# Patient Record
Sex: FEMALE | Race: ASIAN | NOT HISPANIC OR LATINO
[De-identification: names, ages, dates, MRNs, and addresses within clinical notes are randomized per-mention and may not be internally consistent; named-entity substitution may affect disease eponyms.]

---

## 2022-08-26 ENCOUNTER — NON-APPOINTMENT (OUTPATIENT)
Age: 77
End: 2022-08-26

## 2022-08-26 ENCOUNTER — APPOINTMENT (OUTPATIENT)
Dept: OPHTHALMOLOGY | Facility: CLINIC | Age: 77
End: 2022-08-26

## 2022-08-26 PROBLEM — Z00.00 ENCOUNTER FOR PREVENTIVE HEALTH EXAMINATION: Status: ACTIVE | Noted: 2022-08-26

## 2022-08-26 PROCEDURE — 92020 GONIOSCOPY: CPT

## 2022-08-26 PROCEDURE — 76514 ECHO EXAM OF EYE THICKNESS: CPT

## 2022-08-26 PROCEDURE — 92004 COMPRE OPH EXAM NEW PT 1/>: CPT

## 2022-08-26 PROCEDURE — 92133 CPTRZD OPH DX IMG PST SGM ON: CPT

## 2022-09-27 NOTE — ASU PATIENT PROFILE, ADULT - NSICDXPASTSURGICALHX_GEN_ALL_CORE_FT
PAST SURGICAL HISTORY:  Elective surgery Laser to both eyes    History of trabeculectomy both eyes

## 2022-09-27 NOTE — ASU PATIENT PROFILE, ADULT - NSICDXPASTMEDICALHX_GEN_ALL_CORE_FT
PAST MEDICAL HISTORY:  Chronic GERD     Diabetes PRE controlled by diet    Glaucoma     Hypertension

## 2022-09-28 ENCOUNTER — TRANSCRIPTION ENCOUNTER (OUTPATIENT)
Age: 77
End: 2022-09-28

## 2022-09-28 ENCOUNTER — APPOINTMENT (OUTPATIENT)
Dept: OPHTHALMOLOGY | Facility: AMBULATORY SURGERY CENTER | Age: 77
End: 2022-09-28

## 2022-09-28 ENCOUNTER — NON-APPOINTMENT (OUTPATIENT)
Age: 77
End: 2022-09-28

## 2022-09-28 ENCOUNTER — OUTPATIENT (OUTPATIENT)
Dept: OUTPATIENT SERVICES | Facility: HOSPITAL | Age: 77
LOS: 1 days | Discharge: HOME | End: 2022-09-28

## 2022-09-28 VITALS
RESPIRATION RATE: 14 BRPM | OXYGEN SATURATION: 98 % | WEIGHT: 83.78 LBS | HEART RATE: 80 BPM | SYSTOLIC BLOOD PRESSURE: 163 MMHG | DIASTOLIC BLOOD PRESSURE: 76 MMHG | HEIGHT: 58 IN | TEMPERATURE: 98 F

## 2022-09-28 VITALS
RESPIRATION RATE: 16 BRPM | SYSTOLIC BLOOD PRESSURE: 128 MMHG | TEMPERATURE: 98 F | HEART RATE: 80 BPM | DIASTOLIC BLOOD PRESSURE: 60 MMHG

## 2022-09-28 DIAGNOSIS — Z98.890 OTHER SPECIFIED POSTPROCEDURAL STATES: Chronic | ICD-10-CM

## 2022-09-28 DIAGNOSIS — Z41.9 ENCOUNTER FOR PROCEDURE FOR PURPOSES OTHER THAN REMEDYING HEALTH STATE, UNSPECIFIED: Chronic | ICD-10-CM

## 2022-09-28 PROCEDURE — 66170 GLAUCOMA SURGERY: CPT | Mod: RT

## 2022-09-28 RX ORDER — OFLOXACIN 0.3 %
1 DROPS OPHTHALMIC (EYE)
Refills: 0 | Status: COMPLETED | OUTPATIENT
Start: 2022-09-28 | End: 2022-09-28

## 2022-09-28 RX ADMIN — Medication 1 DROP(S): at 08:47

## 2022-09-28 RX ADMIN — Medication 1 DROP(S): at 08:55

## 2022-09-28 RX ADMIN — Medication 1 DROP(S): at 09:05

## 2022-09-28 NOTE — OPERATIVE REPORT - OPERATIVE RPOSRT DETAILS
Patient Name: ROBIN ALLEN    Medical Record Number: [id number]    DATE OF SURGERY: SEPTEMBER 28, 2022    OPERATING SURGEON: NARCISA OLIVEROS M.D.    ASSISTANT SURGEON: ANTHONY ROSARIO M.D.    ANESTHESIA: MONITORED ANESTHESIA CARE AND SUBCONJUNCTIVAL INJECTION.     PREOPERATIVE DIAGNOSIS: GLAUCOMA, RIGHT EYE    POSTOPERATIVE DIAGNOSIS: GLAUCOMA, RIGHT EYE    OPERATIVE PROCEDURE: TRABECULECTOMY WITH MITOMYCIN C, RIGHT EYE    COMPLICATIONS: NONE.     SPECIMENS: NONE.    ESTIMATED BLOOD LOSS: <1 cc    PATIENT CONDITION: STABLE.    PROCEDURE:  Prior to the procedure, all risks, benefits and alternatives were discussed with the patient, including but not limited to infection, bleeding, retinal detachment, increase or decrease in intraocular pressure, corneal edema, corneal decompensation, ptosis, diplopia, loss of vision, no improvement of vision, need for second surgery, macular edema, intraocular inflammation, etc. All questions were answered and the patient wished to proceed with the surgery. Informed consent was obtained.    The patient was wheeled to the operating room and placed on the operating table in a supine position. Next, the right eye was prepped and draped in the usual sterile fashion for intraocular surgery. An eyelid speculum was placed into the right eye.    A 7-0 silk traction suture was placed through the cornea and the eye was rotated inferiorly. Subconjunctival and subtenon lidocaine and mitomycin C 30 micrograms were then injected into the superior quadrant. A superior peritomy was then created with Vannas scissors and clot forceps. The area was carefully dissected posteriorly. A 4 x 2.5-mm scleral flap was created using a #75 blade and dissected half thickness to the limbus using a #57 blade.    Two 10-0 Nylon interrupted scleral flap sutures were pre-placed. #75 blade was used to create a temporal paracentesis. The scleral flap was grasped with .12 forceps and the anterior chamber was entered with a #75 blade. Next, a Glory punch was used to remove a portion of trabecular meshwork.    Two preplaced 10-0 Nylon scleral flap sutures were tied for appropriate flow. One 10-0 Nylon scleral flap suture was added for appropriate flow. The conjunctiva and tenon fascia were then re-approximated to the limbus using three interrupted 10-0 Nylon horizontal mattress sutures. Balanced salt solution was injected into the anterior chamber on a 30 gauge cannula. At the cessation of the procedure, the anterior chamber was well formed with a diffuse filtering bleb superiorly. All wounds were inspected meticulously and found to be watertight. Cefazolin and dexamethasone were applied on the eye. Eyelid speculum was removed. Topical antibiotic and steroid ointment was placed onto the right eye, which was then patched and shielded. The patient left the operating room in an excellent condition.

## 2022-09-28 NOTE — ASU DISCHARGE PLAN (ADULT/PEDIATRIC) - FREQUENT HAND WASHING PREVENTS THE SPREAD OF INFECTION.
Prior authorization in mailbox  
Submitted over CoverMyMeds.     LIDOCAINE Patch  Type of coverage approved: Prior Authorization  This approval authorizes your coverage from 03/15/2019 - 06/12/2020  
Statement Selected

## 2022-09-28 NOTE — ASU DISCHARGE PLAN (ADULT/PEDIATRIC) - NS MD DC FALL RISK RISK
For information on Fall & Injury Prevention, visit: [email or web address] OR  [email or web address] OR  https://www.cdc.gov/steadi/patient.html

## 2022-09-29 ENCOUNTER — NON-APPOINTMENT (OUTPATIENT)
Age: 77
End: 2022-09-29

## 2022-09-29 ENCOUNTER — APPOINTMENT (OUTPATIENT)
Dept: OPHTHALMOLOGY | Facility: CLINIC | Age: 77
End: 2022-09-29

## 2022-09-29 PROCEDURE — 99024 POSTOP FOLLOW-UP VISIT: CPT

## 2022-10-03 PROBLEM — H40.9 UNSPECIFIED GLAUCOMA: Chronic | Status: ACTIVE | Noted: 2022-09-27

## 2022-10-03 PROBLEM — E11.9 TYPE 2 DIABETES MELLITUS WITHOUT COMPLICATIONS: Chronic | Status: ACTIVE | Noted: 2022-09-27

## 2022-10-03 PROBLEM — I10 ESSENTIAL (PRIMARY) HYPERTENSION: Chronic | Status: ACTIVE | Noted: 2022-09-27

## 2022-10-03 PROBLEM — K21.9 GASTRO-ESOPHAGEAL REFLUX DISEASE WITHOUT ESOPHAGITIS: Chronic | Status: ACTIVE | Noted: 2022-09-27

## 2022-10-11 NOTE — ASU PATIENT PROFILE, ADULT - VISION (WITH CORRECTIVE LENSES IF THE PATIENT USUALLY WEARS THEM):
glaucoma/Partially impaired: cannot see medication labels or newsprint, but can see obstacles in path, and the surrounding layout; can count fingers at arm's length

## 2022-10-11 NOTE — ASU PATIENT PROFILE, ADULT - NS PREOP UNDERSTANDS INFO
Bring id card , insurance card , wear comfort clothes, no jewerly. Nothing to eat from midnight, water till 4:30 AM/yes

## 2022-10-11 NOTE — ASU PATIENT PROFILE, ADULT - NSICDXPASTSURGICALHX_GEN_ALL_CORE_FT
PAST SURGICAL HISTORY:  Elective surgery Laser to both eyes    History of glaucoma surgery right eye    History of trabeculectomy both eyes

## 2022-10-11 NOTE — ASU PATIENT PROFILE, ADULT - FALL HARM RISK - HARM RISK INTERVENTIONS

## 2022-10-11 NOTE — ASU PATIENT PROFILE, ADULT - LANGUAGE ASSISTANCE NEEDED
pt's grand daughter MS Bur[place] translating with pt's permision/No-Patient/Caregiver offered and refused free interpretation services.

## 2022-10-11 NOTE — ASU PATIENT PROFILE, ADULT - DOES PATIENT HAVE ADVANCE DIRECTIVE
cont lantus 10 units qhs  cont mod dose humalog scale coverage qac  to add humalog 3 units 3x/day before meals  cont cons cho diet  goal bg 100-180 in hosp setting No

## 2022-10-12 ENCOUNTER — TRANSCRIPTION ENCOUNTER (OUTPATIENT)
Age: 77
End: 2022-10-12

## 2022-10-12 ENCOUNTER — NON-APPOINTMENT (OUTPATIENT)
Age: 77
End: 2022-10-12

## 2022-10-12 ENCOUNTER — OUTPATIENT (OUTPATIENT)
Dept: OUTPATIENT SERVICES | Facility: HOSPITAL | Age: 77
LOS: 1 days | Discharge: HOME | End: 2022-10-12

## 2022-10-12 ENCOUNTER — APPOINTMENT (OUTPATIENT)
Dept: OPHTHALMOLOGY | Facility: AMBULATORY SURGERY CENTER | Age: 77
End: 2022-10-12

## 2022-10-12 VITALS
RESPIRATION RATE: 16 BRPM | DIASTOLIC BLOOD PRESSURE: 67 MMHG | OXYGEN SATURATION: 96 % | TEMPERATURE: 99 F | SYSTOLIC BLOOD PRESSURE: 151 MMHG | HEART RATE: 64 BPM

## 2022-10-12 VITALS
HEART RATE: 66 BPM | SYSTOLIC BLOOD PRESSURE: 160 MMHG | DIASTOLIC BLOOD PRESSURE: 61 MMHG | TEMPERATURE: 98 F | WEIGHT: 84.44 LBS | HEIGHT: 58 IN | RESPIRATION RATE: 14 BRPM | OXYGEN SATURATION: 99 %

## 2022-10-12 DIAGNOSIS — Z41.9 ENCOUNTER FOR PROCEDURE FOR PURPOSES OTHER THAN REMEDYING HEALTH STATE, UNSPECIFIED: Chronic | ICD-10-CM

## 2022-10-12 DIAGNOSIS — Z98.890 OTHER SPECIFIED POSTPROCEDURAL STATES: Chronic | ICD-10-CM

## 2022-10-12 DIAGNOSIS — Z86.69 PERSONAL HISTORY OF OTHER DISEASES OF THE NERVOUS SYSTEM AND SENSE ORGANS: Chronic | ICD-10-CM

## 2022-10-12 PROCEDURE — 66170 GLAUCOMA SURGERY: CPT | Mod: LT,79

## 2022-10-12 RX ORDER — LORATADINE 10 MG/1
1 TABLET ORAL
Qty: 0 | Refills: 0 | DISCHARGE

## 2022-10-12 RX ORDER — ERGOCALCIFEROL 1.25 MG/1
1 CAPSULE ORAL
Qty: 0 | Refills: 0 | DISCHARGE

## 2022-10-12 RX ORDER — FAMOTIDINE 10 MG/ML
0 INJECTION INTRAVENOUS
Qty: 0 | Refills: 0 | DISCHARGE

## 2022-10-12 RX ORDER — OFLOXACIN 0.3 %
1 DROPS OPHTHALMIC (EYE)
Refills: 0 | Status: COMPLETED | OUTPATIENT
Start: 2022-10-12 | End: 2022-10-12

## 2022-10-12 RX ORDER — DEXLANSOPRAZOLE 30 MG/1
1 CAPSULE, DELAYED RELEASE ORAL
Qty: 0 | Refills: 0 | DISCHARGE

## 2022-10-12 RX ORDER — MULTIVIT-MIN/FERROUS GLUCONATE 9 MG/15 ML
1 LIQUID (ML) ORAL
Qty: 0 | Refills: 0 | DISCHARGE

## 2022-10-12 RX ORDER — PREDNISOLONE SODIUM PHOSPHATE 1 %
1 DROPS OPHTHALMIC (EYE)
Qty: 0 | Refills: 0 | DISCHARGE

## 2022-10-12 RX ORDER — SERTRALINE 25 MG/1
1 TABLET, FILM COATED ORAL
Qty: 0 | Refills: 0 | DISCHARGE

## 2022-10-12 RX ORDER — OFLOXACIN 0.3 %
1 DROPS OPHTHALMIC (EYE)
Qty: 0 | Refills: 0 | DISCHARGE

## 2022-10-12 RX ORDER — SIMETHICONE 80 MG/1
0 TABLET, CHEWABLE ORAL
Qty: 0 | Refills: 0 | DISCHARGE

## 2022-10-12 RX ORDER — DILTIAZEM HCL 120 MG
1 CAPSULE, EXT RELEASE 24 HR ORAL
Qty: 0 | Refills: 0 | DISCHARGE

## 2022-10-12 RX ORDER — ICOSAPENT ETHYL 500 MG/1
2 CAPSULE, LIQUID FILLED ORAL
Qty: 0 | Refills: 0 | DISCHARGE

## 2022-10-12 RX ORDER — ROSUVASTATIN CALCIUM 5 MG/1
1 TABLET ORAL
Qty: 0 | Refills: 0 | DISCHARGE

## 2022-10-12 RX ORDER — ACETAMINOPHEN 500 MG
2 TABLET ORAL
Qty: 0 | Refills: 0 | DISCHARGE

## 2022-10-12 RX ADMIN — Medication 1 DROP(S): at 06:53

## 2022-10-12 RX ADMIN — Medication 1 DROP(S): at 07:05

## 2022-10-12 RX ADMIN — Medication 1 DROP(S): at 07:00

## 2022-10-12 NOTE — PRE-ANESTHESIA EVALUATION ADULT - NSANTHOSAYNRD_GEN_A_CORE
No. AGUILA screening performed.  STOP BANG Legend: 0-2 = LOW Risk; 3-4 = INTERMEDIATE Risk; 5-8 = HIGH Risk

## 2022-10-12 NOTE — ASU DISCHARGE PLAN (ADULT/PEDIATRIC) - NS MD DC FALL RISK RISK
For information on Fall & Injury Prevention, visit: [email or web address] OR  [email or web address] OR  https://www.cdc.gov/steadi/patient.html

## 2022-10-12 NOTE — ASU PREOP CHECKLIST - 1.
pt denies allergies but daughter states pt allergic to all glaucoma drops prescribed  so far. allergy band in place.  Daughter translated for staff with Pt.' consent voided in asu.

## 2022-10-13 ENCOUNTER — APPOINTMENT (OUTPATIENT)
Dept: OPHTHALMOLOGY | Facility: CLINIC | Age: 77
End: 2022-10-13

## 2022-10-13 ENCOUNTER — NON-APPOINTMENT (OUTPATIENT)
Age: 77
End: 2022-10-13

## 2022-10-13 PROCEDURE — 99024 POSTOP FOLLOW-UP VISIT: CPT

## 2022-10-17 NOTE — OPERATIVE REPORT - OPERATIVE RPOSRT DETAILS
Patient Name: ROBIN ALLEN    Medical Record Number: [id number]    DATE OF SURGERY: OCTOBER 12, 2022    OPERATING SURGEON: NARCISA OLIVEROS M.D.    ASSISTANT SURGEON: ANTHONY ROSARIO M.D.    ANESTHESIA: MONITORED ANESTHESIA CARE AND SUBCONJUNCTIVAL INJECTION.     PREOPERATIVE DIAGNOSIS: GLAUCOMA, LEFT EYE    POSTOPERATIVE DIAGNOSIS: GLAUCOMA, LEFT EYE    OPERATIVE PROCEDURE: TRABECULECTOMY WITH MITOMYCIN C, LEFT EYE    COMPLICATIONS: NONE.     SPECIMENS: NONE.    ESTIMATED BLOOD LOSS: <1 cc    PATIENT CONDITION: STABLE.    PROCEDURE:  Prior to the procedure, all risks, benefits and alternatives were discussed with the patient, including but not limited to infection, bleeding, retinal detachment, increase or decrease in intraocular pressure, corneal edema, corneal decompensation, ptosis, diplopia, loss of vision, no improvement of vision, need for second surgery, macular edema, intraocular inflammation, etc. All questions were answered and the patient wished to proceed with the surgery. Informed consent was obtained.    The patient was wheeled to the operating room and placed on the operating table in a supine position. Next, the left eye was prepped and draped in the usual sterile fashion for intraocular surgery. An eyelid speculum was placed into the left eye.    A 7-0 silk traction suture was placed through the cornea and the eye was rotated inferiorly. Subconjunctival and subtenon lidocaine and mitomycin C 30 micrograms were then injected into the superior quadrant. A superior peritomy was then created with Vannas scissors and clot forceps. The area was carefully dissected posteriorly. A 4 x 2.5-mm scleral flap was created using a #75 blade and dissected half thickness to the limbus using a #57 blade.    Two 10-0 Nylon interrupted scleral flap sutures were pre-placed. #75 blade was used to create a temporal paracentesis. The scleral flap was grasped with .12 forceps and the anterior chamber was entered with a #75 blade. Next, a Glory punch was used to remove a portion of trabecular meshwork.    Two preplaced 10-0 Nylon scleral flap sutures were tied for appropriate flow. One 10-0 Nylon scleral flap suture was added for appropriate flow. The conjunctiva and tenon fascia were then re-approximated to the limbus using three interrupted 10-0 Nylon horizontal mattress sutures. Balanced salt solution was injected into the anterior chamber on a 30 gauge cannula. At the cessation of the procedure, the anterior chamber was well formed with a diffuse filtering bleb superiorly. All wounds were inspected meticulously and found to be watertight. Cefazolin and dexamethasone were applied on the eye. Eyelid speculum was removed. Topical antibiotic and steroid ointment was placed onto the left eye, which was then patched and shielded. The patient left the operating room in an excellent condition.     Patient Name: ROBIN ALLEN    Medical Record Number: [id number]    DATE OF SURGERY: OCTOBER 12, 2022    OPERATING SURGEON: NARCISA OLIVEROS M.D.    ASSISTANT SURGEON: ANTHONY ROSARIO M.D.    ANESTHESIA: MONITORED ANESTHESIA CARE AND SUBCONJUNCTIVAL INJECTION.     PREOPERATIVE DIAGNOSIS: GLAUCOMA, LEFT EYE    POSTOPERATIVE DIAGNOSIS: GLAUCOMA, LEFT EYE    OPERATIVE PROCEDURE: TRABECULECTOMY WITH MITOMYCIN C, LEFT EYE    COMPLICATIONS: NONE.     SPECIMENS: NONE.    ESTIMATED BLOOD LOSS: <1 cc    PATIENT CONDITION: STABLE.    PROCEDURE:  Prior to the procedure, all risks, benefits and alternatives were discussed with the patient, including but not limited to infection, bleeding, retinal detachment, increase or decrease in intraocular pressure, corneal edema, corneal decompensation, ptosis, diplopia, loss of vision, no improvement of vision, need for second surgery, macular edema, intraocular inflammation, etc. All questions were answered and the patient wished to proceed with the surgery. Informed consent was obtained.    The patient was wheeled to the operating room and placed on the operating table in a supine position. Next, the left eye was prepped and draped in the usual sterile fashion for intraocular surgery. An eyelid speculum was placed into the left eye.    A 7-0 silk traction suture was placed through the cornea and the eye was rotated inferiorly. Subconjunctival and subtenon lidocaine and mitomycin C 30 micrograms were then injected into the superior quadrant. A superior peritomy was then created with Vannas scissors and clot forceps. The area was carefully dissected posteriorly. A 4 x 2.5-mm scleral flap was created using a #75 blade and dissected half thickness to the limbus using a #57 blade.    Two 10-0 Nylon interrupted scleral flap sutures were pre-placed. #75 blade was used to create a temporal paracentesis. The scleral flap was grasped with .12 forceps and the anterior chamber was entered with a #75 blade. Next, a Glory punch was used to remove a portion of trabecular meshwork.    Two preplaced 10-0 Nylon scleral flap sutures were tied for appropriate flow. One 10-0 Nylon scleral flap suture was added for appropriate flow. The conjunctiva and tenon fascia were then re-approximated to the limbus using three interrupted 10-0 Nylon horizontal mattress sutures. Balanced salt solution was injected into the anterior chamber on a 30 gauge cannula. At the cessation of the procedure, the anterior chamber was well formed with a diffuse filtering bleb superiorly. All wounds were inspected meticulously and found to be watertight. Cefazolin and dexamethasone were applied on the eye. Eyelid speculum was removed. Topical antibiotic and steroid ointment was placed onto the left eye, which was then patched and shielded. The patient left the operating room in an excellent condition.

## 2022-10-25 ENCOUNTER — NON-APPOINTMENT (OUTPATIENT)
Age: 77
End: 2022-10-25

## 2022-10-25 ENCOUNTER — APPOINTMENT (OUTPATIENT)
Dept: OPHTHALMOLOGY | Facility: CLINIC | Age: 77
End: 2022-10-25

## 2022-10-25 PROCEDURE — 99024 POSTOP FOLLOW-UP VISIT: CPT

## 2022-11-08 ENCOUNTER — APPOINTMENT (OUTPATIENT)
Dept: OPHTHALMOLOGY | Facility: CLINIC | Age: 77
End: 2022-11-08

## 2022-11-08 ENCOUNTER — NON-APPOINTMENT (OUTPATIENT)
Age: 77
End: 2022-11-08

## 2022-11-08 PROCEDURE — 99024 POSTOP FOLLOW-UP VISIT: CPT

## 2022-11-29 ENCOUNTER — NON-APPOINTMENT (OUTPATIENT)
Age: 77
End: 2022-11-29

## 2022-11-29 ENCOUNTER — APPOINTMENT (OUTPATIENT)
Dept: OPHTHALMOLOGY | Facility: CLINIC | Age: 77
End: 2022-11-29

## 2022-11-29 PROCEDURE — 99024 POSTOP FOLLOW-UP VISIT: CPT

## 2022-12-13 ENCOUNTER — APPOINTMENT (OUTPATIENT)
Dept: OPHTHALMOLOGY | Facility: CLINIC | Age: 77
End: 2022-12-13

## 2022-12-13 ENCOUNTER — NON-APPOINTMENT (OUTPATIENT)
Age: 77
End: 2022-12-13

## 2022-12-13 PROCEDURE — 99024 POSTOP FOLLOW-UP VISIT: CPT

## 2023-01-03 ENCOUNTER — APPOINTMENT (OUTPATIENT)
Dept: OPHTHALMOLOGY | Facility: CLINIC | Age: 78
End: 2023-01-03
Payer: MEDICARE

## 2023-01-03 ENCOUNTER — NON-APPOINTMENT (OUTPATIENT)
Age: 78
End: 2023-01-03

## 2023-01-03 PROCEDURE — 99024 POSTOP FOLLOW-UP VISIT: CPT

## 2023-03-07 ENCOUNTER — APPOINTMENT (OUTPATIENT)
Dept: OPHTHALMOLOGY | Facility: CLINIC | Age: 78
End: 2023-03-07
Payer: MEDICARE

## 2023-03-07 ENCOUNTER — NON-APPOINTMENT (OUTPATIENT)
Age: 78
End: 2023-03-07

## 2023-03-07 PROCEDURE — 92014 COMPRE OPH EXAM EST PT 1/>: CPT

## 2023-03-07 PROCEDURE — 92133 CPTRZD OPH DX IMG PST SGM ON: CPT

## 2023-06-13 ENCOUNTER — APPOINTMENT (OUTPATIENT)
Dept: OPHTHALMOLOGY | Facility: CLINIC | Age: 78
End: 2023-06-13
Payer: MEDICARE

## 2023-06-13 ENCOUNTER — NON-APPOINTMENT (OUTPATIENT)
Age: 78
End: 2023-06-13

## 2023-06-13 PROCEDURE — 92014 COMPRE OPH EXAM EST PT 1/>: CPT

## 2023-06-13 PROCEDURE — 92133 CPTRZD OPH DX IMG PST SGM ON: CPT

## 2023-09-12 ENCOUNTER — NON-APPOINTMENT (OUTPATIENT)
Age: 78
End: 2023-09-12

## 2023-09-12 ENCOUNTER — APPOINTMENT (OUTPATIENT)
Dept: OPHTHALMOLOGY | Facility: CLINIC | Age: 78
End: 2023-09-12
Payer: MEDICARE

## 2023-09-12 PROCEDURE — 92083 EXTENDED VISUAL FIELD XM: CPT

## 2023-09-12 PROCEDURE — 92014 COMPRE OPH EXAM EST PT 1/>: CPT

## 2023-09-12 PROCEDURE — 92133 CPTRZD OPH DX IMG PST SGM ON: CPT

## 2024-01-16 ENCOUNTER — NON-APPOINTMENT (OUTPATIENT)
Age: 79
End: 2024-01-16

## 2024-01-16 ENCOUNTER — APPOINTMENT (OUTPATIENT)
Dept: OPHTHALMOLOGY | Facility: CLINIC | Age: 79
End: 2024-01-16
Payer: MEDICARE

## 2024-01-16 PROCEDURE — 92133 CPTRZD OPH DX IMG PST SGM ON: CPT

## 2024-01-16 PROCEDURE — 92014 COMPRE OPH EXAM EST PT 1/>: CPT

## 2024-01-16 PROCEDURE — 92083 EXTENDED VISUAL FIELD XM: CPT

## 2024-05-21 ENCOUNTER — NON-APPOINTMENT (OUTPATIENT)
Age: 79
End: 2024-05-21

## 2024-05-21 ENCOUNTER — APPOINTMENT (OUTPATIENT)
Dept: OPHTHALMOLOGY | Facility: CLINIC | Age: 79
End: 2024-05-21
Payer: MEDICARE

## 2024-05-21 PROCEDURE — 92133 CPTRZD OPH DX IMG PST SGM ON: CPT

## 2024-05-21 PROCEDURE — 92083 EXTENDED VISUAL FIELD XM: CPT

## 2024-05-21 PROCEDURE — 92014 COMPRE OPH EXAM EST PT 1/>: CPT

## 2024-10-08 ENCOUNTER — APPOINTMENT (OUTPATIENT)
Dept: OPHTHALMOLOGY | Facility: CLINIC | Age: 79
End: 2024-10-08
Payer: MEDICARE

## 2024-10-08 ENCOUNTER — NON-APPOINTMENT (OUTPATIENT)
Age: 79
End: 2024-10-08

## 2024-10-08 PROCEDURE — 92014 COMPRE OPH EXAM EST PT 1/>: CPT

## 2024-10-08 PROCEDURE — 92083 EXTENDED VISUAL FIELD XM: CPT

## 2024-10-08 PROCEDURE — 92133 CPTRZD OPH DX IMG PST SGM ON: CPT

## 2025-03-14 ENCOUNTER — APPOINTMENT (OUTPATIENT)
Dept: OPHTHALMOLOGY | Facility: CLINIC | Age: 80
End: 2025-03-14
Payer: MEDICARE

## 2025-03-14 ENCOUNTER — NON-APPOINTMENT (OUTPATIENT)
Age: 80
End: 2025-03-14

## 2025-03-14 PROCEDURE — 92014 COMPRE OPH EXAM EST PT 1/>: CPT

## 2025-03-14 PROCEDURE — 92133 CPTRZD OPH DX IMG PST SGM ON: CPT

## 2025-03-14 PROCEDURE — 92083 EXTENDED VISUAL FIELD XM: CPT

## 2025-07-18 ENCOUNTER — APPOINTMENT (OUTPATIENT)
Dept: OPHTHALMOLOGY | Facility: CLINIC | Age: 80
End: 2025-07-18
Payer: MEDICARE

## 2025-07-18 ENCOUNTER — NON-APPOINTMENT (OUTPATIENT)
Age: 80
End: 2025-07-18

## 2025-07-18 PROCEDURE — 92083 EXTENDED VISUAL FIELD XM: CPT

## 2025-07-18 PROCEDURE — 92133 CPTRZD OPH DX IMG PST SGM ON: CPT

## 2025-07-18 PROCEDURE — 92014 COMPRE OPH EXAM EST PT 1/>: CPT

## (undated) DEVICE — MARKING PEN W RULER

## (undated) DEVICE — NDL HYPO REGULAR BEVEL 30G X .5"

## (undated) DEVICE — KNIFE ALCON I-KNIFE II STAB KNIFE STANDARD 3MM (GREEN)

## (undated) DEVICE — GLV 7.5 PROTEXIS (WHITE)

## (undated) DEVICE — BLADE MULTI SIDED MICROSCLECTROMY

## (undated) DEVICE — PACK ANTERIOR SEGMENT

## (undated) DEVICE — SYR LUER LOK 1CC

## (undated) DEVICE — SPEAR CELLULOSE 40410

## (undated) DEVICE — SUT SILK 7-0 18" TG140-8

## (undated) DEVICE — CANNULA IRR ANT CHAMBER 30G

## (undated) DEVICE — SUT ETHILON 10-0 12" TG160-4

## (undated) DEVICE — ELCTR ERASER BI-P BVL 45DEG 18G

## (undated) DEVICE — ELCTR BIPOLAR CORD 12FT

## (undated) DEVICE — DRAPE MAYO STAND 23"

## (undated) DEVICE — APPLICATOR COTTON TIP 3" STERILE

## (undated) DEVICE — DRAPE MICROSCOPE KNOB COVER SMALL (2 PCS)

## (undated) DEVICE — SUT NYLON 9-0 5" BV130-5